# Patient Record
Sex: FEMALE | Race: WHITE
[De-identification: names, ages, dates, MRNs, and addresses within clinical notes are randomized per-mention and may not be internally consistent; named-entity substitution may affect disease eponyms.]

---
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
## 2019-03-02 ENCOUNTER — HOSPITAL ENCOUNTER (EMERGENCY)
Dept: HOSPITAL 63 - ER | Age: 19
Discharge: HOME | End: 2019-03-02
Payer: SELF-PAY

## 2019-03-02 VITALS — SYSTOLIC BLOOD PRESSURE: 100 MMHG | DIASTOLIC BLOOD PRESSURE: 47 MMHG

## 2019-03-02 VITALS — WEIGHT: 293 LBS | BODY MASS INDEX: 43.4 KG/M2 | HEIGHT: 69 IN

## 2019-03-02 DIAGNOSIS — J45.909: ICD-10-CM

## 2019-03-02 DIAGNOSIS — J10.1: Primary | ICD-10-CM

## 2019-03-02 DIAGNOSIS — R00.0: ICD-10-CM

## 2019-03-02 DIAGNOSIS — R74.0: ICD-10-CM

## 2019-03-02 LAB
ALBUMIN SERPL-MCNC: 3.3 G/DL (ref 3.4–5)
ALBUMIN/GLOB SERPL: 0.9 {RATIO} (ref 1–1.7)
ALP SERPL-CCNC: 66 U/L (ref 46–116)
ALT SERPL-CCNC: 33 U/L (ref 14–59)
ANION GAP SERPL CALC-SCNC: 12 MMOL/L (ref 6–14)
AST SERPL-CCNC: 18 U/L (ref 15–37)
BASOPHILS # BLD AUTO: 0.1 X10^3/UL (ref 0–0.2)
BASOPHILS NFR BLD: 1 % (ref 0–3)
BILIRUB SERPL-MCNC: 0.7 MG/DL (ref 0.2–1)
BUN/CREAT SERPL: 19 (ref 6–20)
CA-I SERPL ISE-MCNC: 19 MG/DL (ref 7–20)
CALCIUM SERPL-MCNC: 8.2 MG/DL (ref 8.5–10.1)
CHLORIDE SERPL-SCNC: 103 MMOL/L (ref 98–107)
CO2 SERPL-SCNC: 22 MMOL/L (ref 21–32)
CREAT SERPL-MCNC: 1 MG/DL (ref 0.6–1)
EOSINOPHIL NFR BLD: 0.1 X10^3/UL (ref 0–0.7)
EOSINOPHIL NFR BLD: 1 % (ref 0–3)
ERYTHROCYTE [DISTWIDTH] IN BLOOD BY AUTOMATED COUNT: 12.9 % (ref 11.5–14.5)
GFR SERPLBLD BASED ON 1.73 SQ M-ARVRAT: 71.4 ML/MIN
GLOBULIN SER-MCNC: 3.8 G/DL (ref 2.2–3.8)
GLUCOSE SERPL-MCNC: 116 MG/DL (ref 70–99)
HCT VFR BLD CALC: 40.7 % (ref 36–47)
HGB BLD-MCNC: 13.9 G/DL (ref 12–15.5)
INFLUENZA A PATIENT: POSITIVE
INFLUENZA B PATIENT: NEGATIVE
LYMPHOCYTES # BLD: 0.6 X10^3/UL (ref 1–4.8)
LYMPHOCYTES NFR BLD AUTO: 5 % (ref 24–48)
MCH RBC QN AUTO: 30 PG (ref 25–35)
MCHC RBC AUTO-ENTMCNC: 34 G/DL (ref 31–37)
MCV RBC AUTO: 88 FL (ref 79–100)
MONO #: 0.8 X10^3/UL (ref 0–1.1)
MONOCYTES NFR BLD: 7 % (ref 0–9)
NEUT #: 11 X10^3UL (ref 1.8–7.7)
NEUTROPHILS NFR BLD AUTO: 87 % (ref 31–73)
PLATELET # BLD AUTO: 296 X10^3/UL (ref 140–400)
POTASSIUM SERPL-SCNC: 3.6 MMOL/L (ref 3.5–5.1)
PROT SERPL-MCNC: 7.1 G/DL (ref 6.4–8.2)
RBC # BLD AUTO: 4.65 X10^6/UL (ref 3.5–5.4)
SODIUM SERPL-SCNC: 137 MMOL/L (ref 136–145)
WBC # BLD AUTO: 12.6 X10^3/UL (ref 4–11)

## 2019-03-02 PROCEDURE — 85025 COMPLETE CBC W/AUTO DIFF WBC: CPT

## 2019-03-02 PROCEDURE — 87804 INFLUENZA ASSAY W/OPTIC: CPT

## 2019-03-02 PROCEDURE — 87040 BLOOD CULTURE FOR BACTERIA: CPT

## 2019-03-02 PROCEDURE — 93005 ELECTROCARDIOGRAM TRACING: CPT

## 2019-03-02 PROCEDURE — 96374 THER/PROPH/DIAG INJ IV PUSH: CPT

## 2019-03-02 PROCEDURE — 36415 COLL VENOUS BLD VENIPUNCTURE: CPT

## 2019-03-02 PROCEDURE — 99284 EMERGENCY DEPT VISIT MOD MDM: CPT

## 2019-03-02 PROCEDURE — 71046 X-RAY EXAM CHEST 2 VIEWS: CPT

## 2019-03-02 PROCEDURE — 80053 COMPREHEN METABOLIC PANEL: CPT

## 2019-03-02 PROCEDURE — 83605 ASSAY OF LACTIC ACID: CPT

## 2019-03-02 NOTE — PHYS DOC
Past History


Past Medical History:  Asthma


Past Surgical History:  No Surgical History


Smoking:  Non-smoker


Alcohol Use:  None


Drug Use:  None





Adult General


Chief Complaint


Chief Complaint:  SHORTNESS OF BREATH





HPI


HPI





Patient is a 18 year old female with history of asthma who presents with 

feeling of fever and shortness of breath. Patient states she had marked cough 

and generalized weakness yesterday and since this morning had shortness of 

breath with nonproductive cough and fever as high as 103. Patient complaining 

of nasal congestion and sore throat and generalized myalgia. Patient denies 

sick contact.





Review of Systems


Review of Systems





Constitutional: Reports fever and chills


Eyes: Denies change in visual acuity, redness, or eye pain []


HENT: Reports nasal congestion and sore throat


Respiratory: Reports cough and shortness of breath]


Cardiovascular: No additional information not addressed in HPI []


GI: Denies abdominal pain, nausea, vomiting, bloody stools or diarrhea []


: Denies dysuria or hematuria []


Musculoskeletal: Denies back pain or joint pain []


Integument: Denies rash or skin lesions []


Neurologic: Denies headache, focal weakness or sensory changes []


Endocrine: Denies polyuria or polydipsia []





All other systems were reviewed and found to be within normal limits, except as 

documented in this note.





Current Medications


Current Medications





Current Medications








 Medications


  (Trade)  Dose


 Ordered  Sig/Myra  Start Time


 Stop Time Status Last Admin


Dose Admin


 


 Ceftriaxone


 Sodium 1 gm/


 Sodium Chloride  50 ml @ 


 100 mls/hr  1X  ONCE  3/2/19 15:00


 3/2/19 15:01 DC  





 


 Ceftriaxone Sodium


  (Rocephin)  1 gm  STK-MED ONCE  3/2/19 14:52


 3/2/19 15:02 DC  





 


 Ibuprofen


  (Motrin)  800 mg  1X  ONCE  3/2/19 14:15


 3/2/19 14:16 DC 3/2/19 14:24


800 MG


 


 Ketorolac


 Tromethamine


  (Toradol 30mg


 Vial)  30 mg  1X  ONCE  3/2/19 15:30


 3/2/19 15:31 DC 3/2/19 15:26


30 MG


 


 Oseltamivir


 Phosphate


  (Tamiflu)  75 mg  1X  ONCE  3/2/19 15:30


 3/2/19 15:31 DC 3/2/19 15:25


75 MG


 


 Sodium Chloride  50 ml @ As


 Directed  STK-MED ONCE  3/2/19 14:51


 3/2/19 14:52 DC  














Allergies


Allergies





Allergies








Coded Allergies Type Severity Reaction Last Updated Verified


 


  No Known Drug Allergies    10/30/17 No











Physical Exam


Physical Exam





Constitutional: Well developed, well nourished, moderate distress, non-toxic 

appearance. []


HENT: Normocephalic, atraumatic, bilateral external ears normal, oropharynx dry

, pharyngeal erythema no oral exudates, nose normal. []


Eyes: PERRLA, EOMI, conjunctiva normal, no discharge. [] 


Neck: Normal range of motion, no tenderness, supple, no stridor. [] 


Cardiovascular: Tachycardia ,no murmur []


Lungs & Thorax: Marked respiratory distress with tachypnea and marked wheezing 

bilaterally


Abdomen: Bowel sounds normal, soft, no tenderness, no masses, no pulsatile 

masses. [] 


Skin: Warm, dry, no erythema, no rash. [] 


Back: No tenderness, no CVA tenderness. [] 


Extremities: No tenderness, no cyanosis, no clubbing, ROM intact, no edema. [] 


Neurologic: Alert and oriented X 3, normal motor function, normal sensory 

function, no focal deficits noted. []


Psychologic: Affect normal, judgement normal, mood normal. []





Current Patient Data


Vital Signs





 Vital Signs








  Date Time  Temp Pulse Resp B/P (MAP) Pulse Ox O2 Delivery O2 Flow Rate FiO2


 


3/2/19 15:48  110 16 101/58 (72) 98 Nasal Cannula 3.0 


 


3/2/19 14:48 100.9       








Lab Results





 Laboratory Tests








Test


 3/2/19


13:58 3/2/19


14:27 3/2/19


14:42


 


White Blood Count


 12.6 x10^3/uL


(4.0-11.0)  H 


 





 


Red Blood Count


 4.65 x10^6/uL


(3.50-5.40) 


 





 


Hemoglobin


 13.9 g/dL


(12.0-15.5) 


 





 


Hematocrit


 40.7 %


(36.0-47.0) 


 





 


Mean Corpuscular Volume


 88 fL ()


 


 





 


Mean Corpuscular Hemoglobin 30 pg (25-35)    


 


Mean Corpuscular Hemoglobin


Concent 34 g/dL


(31-37) 


 





 


Red Cell Distribution Width


 12.9 %


(11.5-14.5) 


 





 


Platelet Count


 296 x10^3/uL


(140-400) 


 





 


Neutrophils (%) (Auto) 87 % (31-73)  H  


 


Lymphocytes (%) (Auto) 5 % (24-48)  L  


 


Monocytes (%) (Auto) 7 % (0-9)    


 


Eosinophils (%) (Auto) 1 % (0-3)    


 


Basophils (%) (Auto) 1 % (0-3)    


 


Neutrophils # (Auto)


 11.0 x10^3uL


(1.8-7.7)  H 


 





 


Lymphocytes # (Auto)


 0.6 x10^3/uL


(1.0-4.8)  L 


 





 


Monocytes # (Auto)


 0.8 x10^3/uL


(0.0-1.1) 


 





 


Eosinophils # (Auto)


 0.1 x10^3/uL


(0.0-0.7) 


 





 


Basophils # (Auto)


 0.1 x10^3/uL


(0.0-0.2) 


 





 


Lactic Acid Level


 2.1 mmol/L


(0.4-2.0)  H 


 





 


Influenza Type A (Rapid)


 


 Positive


(NEGATIVE) 





 


Influenza Type B (Rapid)


 


 Negative


(NEGATIVE) 





 


Sodium Level


 


 


 137 mmol/L


(136-145)


 


Potassium Level


 


 


 3.6 mmol/L


(3.5-5.1)


 


Chloride Level


 


 


 103 mmol/L


()


 


Carbon Dioxide Level


 


 


 22 mmol/L


(21-32)


 


Anion Gap   12 (6-14)  


 


Blood Urea Nitrogen


 


 


 19 mg/dL


(7-20)


 


Creatinine


 


 


 1.0 mg/dL


(0.6-1.0)


 


Estimated GFR


(Cockcroft-Gault) 


 


 71.4  





 


BUN/Creatinine Ratio   19 (6-20)  


 


Glucose Level


 


 


 116 mg/dL


(70-99)  H


 


Calcium Level


 


 


 8.2 mg/dL


(8.5-10.1)  L


 


Total Bilirubin


 


 


 0.7 mg/dL


(0.2-1.0)


 


Aspartate Amino Transferase


(AST) 


 


 18 U/L (15-37)





 


Alanine Aminotransferase (ALT)


 


 


 33 U/L (14-59)





 


Alkaline Phosphatase


 


 


 66 U/L


()


 


Total Protein


 


 


 7.1 g/dL


(6.4-8.2)


 


Albumin


 


 


 3.3 g/dL


(3.4-5.0)  L


 


Albumin/Globulin Ratio


 


 


 0.9 (1.0-1.7)


L











EKG


EKG


Chief interpreted by me. EKG at 1353 showed sinus tachycardia at rate of 152, 

no acute ST-T wave abnormalities.





Radiology/Procedures


Radiology/Procedures


 [facility] [address], [place], [place] [id number] [phone]


 


 IMAGING REPORT





 Signed





PATIENT: PARRIS GONZALEZ ACCOUNT: [id number] MRN#: [id number]


: 2000 LOCATION: ER AGE: 18


SEX: F EXAM DT: 19 ACCESSION#: [id number]


STATUS: PRE ER ORD. PHYSICIAN: DONI CHATMAN MD 


REASON: shortness of breath


PROCEDURE: CHEST PA & LATERAL





CHEST PA   LATERAL


 


Technique:  PA and lateral views of the chest were obtained.


 


Clinical History: SHORT OF BREATH, HX OF ASTHMA, PT SHIELDED


 


Comparison: None.


 


Findings:


 


The heart and pulmonary vessels appear normal. There is low lung volumes 


causing crowding of pulmonary vasculature. Linear opacities in the left 


lung base.


 


Impression:


 


Low lung volumes. Left lower lobe infiltrate could be discoid atelectasis 


or early pneumonia.


 


Electronically signed by: Chris Hercules III, MD (3/2/2019 2:26 PM) [facility]














DICTATED AND SIGNED BY:     CHRIS HERCULES III, MD


DATE:     19 [id number]





CC: DONI CHATMAN MD; PCP,NO ~





Course & Med Decision Making


Course & Med Decision Making


Pertinent Labs and Imaging studies reviewed. (See chart for details)





Evaluation of patient in ER showed 18-year-old female patient presented with 

fever and shortness of breath and sinus tachycardia at 160s. Patient had 

positive Flu A. patient was treated with IV fluid, Tamiflu, ibuprofen with 

improvement of tachycardia and fever. Patient had mild elevation of lactic acid 

at 2.1 related to fever and tachycardia and flu symptoms. Chest x-ray was 

concern for atelectasis or early pneumonia but because of positive flu 

antibiotic except for Tamiflu was not given. Patient felt better and ambulated 

without problem and instructed to follow-up with her primary care physician.





Dragon Disclaimer


Dragon Disclaimer


This electronic medical record was generated, in whole or in part, using a 

voice recognition dictation system.





Departure


Departure:


Impression:  


 Primary Impression:  


 Influenza A


 Additional Impressions:  


 Shortness of breath


 Fever


 Sinus tachycardia


 History of asthma


 Elevated lactic acid level


Disposition:   HOME, SELF-CARE (at 1618)


Condition:  IMPROVED


Referrals:  


PCP,NO (PCP)


Patient Instructions:  Asthma Attacks, Prevention, Fever, Adult, Influenza A (

H1N1), Shortness of Breath





Additional Instructions:  


Drink plenty of liquids


Follow-up with your primary care physician in 3-5 days


Return to ER if not getting better


Alternate Tylenol and ibuprofen every 4 hours for fever and pain


Scripts


Oseltamivir Phosphate (TAMIFLU) 75 Mg Capsule


1 CAP PO BID for influenza, #10 CAP


   Prov: DONI CHATMAN MD         3/2/19 


Hydrocodone/Chlorphen P-Stirex (Tussionex Pennkinetic Susp) 115 Ml Nesha.er.12h


5 ML PO BID for cough and congestion, #120 ML


   Prov: DONI CHATMAN MD         3/2/19





Critical Care Time


 Critical care time was 80 minutes exclusive of procedures.





Problem Qualifiers








 Additional Impressions:  


 Fever


 Fever type:  unspecified  Qualified Codes:  R50.9 - Fever, unspecified








DONI CHATMAN MD Mar 2, 2019 16:22

## 2019-03-02 NOTE — RAD
CHEST PA   LATERAL

 

Technique:  PA and lateral views of the chest were obtained.

 

Clinical History: SHORT OF BREATH, HX OF ASTHMA, PT SHIELDED

 

Comparison: None.

 

Findings:

 

The heart and pulmonary vessels appear normal. There is low lung volumes 

causing crowding of pulmonary vasculature. Linear opacities in the left 

lung base.

 

Impression:

 

Low lung volumes. Left lower lobe infiltrate could be discoid atelectasis 

or early pneumonia.

 

Electronically signed by: Mikie Estrada III, MD (3/2/2019 2:26 PM) [facility]

## 2019-03-05 NOTE — EKG
[facility] [address], [place] [address]

Test Date:    2019               Test Time:    13:53:26

Pat Name:     PARRIS GONZALEZ         Department:   

Patient ID:   [id number]           Room:          

Gender:       F                        Technician:   RAKESH

:          2000               Requested By: DONI CHATMAN

Order Number: [id number]            Reading MD:   Claudio Cordova MD

                                 Measurements

Intervals                              Axis          

Rate:         152                      P:            

CT:                                    QRS:          66

QRSD:         72                       T:            28

QT:           318                                    

QTc:          513                                    

                           Interpretive Statements

SINUS TACHYCARDIA - LIKELY

NON-SPECIFIC ST/T CHANGES

Electronically Signed On 3-5-2019 15:17:34 CST by Claudio Cordova MD